# Patient Record
Sex: FEMALE | Race: BLACK OR AFRICAN AMERICAN | Employment: FULL TIME | URBAN - METROPOLITAN AREA
[De-identification: names, ages, dates, MRNs, and addresses within clinical notes are randomized per-mention and may not be internally consistent; named-entity substitution may affect disease eponyms.]

---

## 2018-09-28 ENCOUNTER — TRANSFERRED RECORDS (OUTPATIENT)
Dept: HEALTH INFORMATION MANAGEMENT | Facility: CLINIC | Age: 23
End: 2018-09-28

## 2020-01-08 ENCOUNTER — OFFICE VISIT (OUTPATIENT)
Dept: FAMILY MEDICINE | Facility: CLINIC | Age: 25
End: 2020-01-08
Payer: COMMERCIAL

## 2020-01-08 VITALS
RESPIRATION RATE: 16 BRPM | OXYGEN SATURATION: 100 % | HEART RATE: 98 BPM | HEIGHT: 64 IN | BODY MASS INDEX: 21.17 KG/M2 | SYSTOLIC BLOOD PRESSURE: 110 MMHG | DIASTOLIC BLOOD PRESSURE: 76 MMHG | WEIGHT: 124 LBS | TEMPERATURE: 97.4 F

## 2020-01-08 DIAGNOSIS — R10.2 PELVIC PAIN IN FEMALE: ICD-10-CM

## 2020-01-08 DIAGNOSIS — R10.84 ABDOMINAL PAIN, GENERALIZED: Primary | ICD-10-CM

## 2020-01-08 LAB
ALBUMIN SERPL-MCNC: 4.5 G/DL (ref 3.4–5)
ALP SERPL-CCNC: 60 U/L (ref 40–150)
ALT SERPL W P-5'-P-CCNC: 37 U/L (ref 0–50)
ANION GAP SERPL CALCULATED.3IONS-SCNC: 8 MMOL/L (ref 3–14)
AST SERPL W P-5'-P-CCNC: 21 U/L (ref 0–45)
BASOPHILS # BLD AUTO: 0 10E9/L (ref 0–0.2)
BASOPHILS NFR BLD AUTO: 0.4 %
BILIRUB SERPL-MCNC: 0.4 MG/DL (ref 0.2–1.3)
BILIRUBIN UR: NEGATIVE MG/DL
BLOOD UR: NEGATIVE MG/DL
BUN SERPL-MCNC: 13 MG/DL (ref 7–30)
CALCIUM SERPL-MCNC: 9 MG/DL (ref 8.5–10.1)
CHLORIDE SERPL-SCNC: 103 MMOL/L (ref 94–109)
CLARITY, URINE: CLEAR
CO2 SERPL-SCNC: 23 MMOL/L (ref 20–32)
COLOR UR: YELLOW
CREAT SERPL-MCNC: 0.71 MG/DL (ref 0.52–1.04)
DIFFERENTIAL METHOD BLD: ABNORMAL
EOSINOPHIL # BLD AUTO: 0 10E9/L (ref 0–0.7)
EOSINOPHIL NFR BLD AUTO: 0.6 %
ERYTHROCYTE [DISTWIDTH] IN BLOOD BY AUTOMATED COUNT: 17.9 % (ref 10–15)
GFR SERPL CREATININE-BSD FRML MDRD: >90 ML/MIN/{1.73_M2}
GLUCOSE SERPL-MCNC: 74 MG/DL (ref 70–99)
GLUCOSE URINE: NEGATIVE
HCG UR QL: NEGATIVE
HCT VFR BLD AUTO: 34 % (ref 35–47)
HGB BLD-MCNC: 9.5 G/DL (ref 11.7–15.7)
IMM GRANULOCYTES # BLD: 0 10E9/L (ref 0–0.4)
IMM GRANULOCYTES NFR BLD: 0.2 %
KETONES UR QL: NEGATIVE MG/DL
LEUKOCYTE ESTERASE UR: NORMAL
LYMPHOCYTES # BLD AUTO: 1.6 10E9/L (ref 0.8–5.3)
LYMPHOCYTES NFR BLD AUTO: 29.2 %
MCH RBC QN AUTO: 18.9 PG (ref 26.5–33)
MCHC RBC AUTO-ENTMCNC: 27.9 G/DL (ref 31.5–36.5)
MCV RBC AUTO: 68 FL (ref 78–100)
MONOCYTES # BLD AUTO: 0.9 10E9/L (ref 0–1.3)
MONOCYTES NFR BLD AUTO: 16 %
NEUTROPHILS # BLD AUTO: 2.9 10E9/L (ref 1.6–8.3)
NEUTROPHILS NFR BLD AUTO: 53.6 %
NITRITE UR QL STRIP: NEGATIVE MG/DL
NRBC # BLD AUTO: 0 10*3/UL
NRBC BLD AUTO-RTO: 0 /100
PH UR STRIP: 6.5 [PH] (ref 4.5–8)
PLATELET # BLD AUTO: 312 10E9/L (ref 150–450)
POTASSIUM SERPL-SCNC: 4.2 MMOL/L (ref 3.4–5.3)
PROT SERPL-MCNC: 8.4 G/DL (ref 6.8–8.8)
PROTEIN UR: NEGATIVE MG/DL
RBC # BLD AUTO: 5.03 10E12/L (ref 3.8–5.2)
SODIUM SERPL-SCNC: 134 MMOL/L (ref 133–144)
SP GR UR STRIP: 1 (ref 1–1)
UROBILINOGEN UR STRIP-ACNC: NORMAL E.U./DL
WBC # BLD AUTO: 5.4 10E9/L (ref 4–11)

## 2020-01-08 SDOH — HEALTH STABILITY: MENTAL HEALTH: HOW MANY STANDARD DRINKS CONTAINING ALCOHOL DO YOU HAVE ON A TYPICAL DAY?: 1 OR 2

## 2020-01-08 SDOH — HEALTH STABILITY: MENTAL HEALTH: HOW OFTEN DO YOU HAVE A DRINK CONTAINING ALCOHOL?: 2-4 TIMES A MONTH

## 2020-01-08 SDOH — HEALTH STABILITY: MENTAL HEALTH: HOW OFTEN DO YOU HAVE 6 OR MORE DRINKS ON ONE OCCASION?: NEVER

## 2020-01-08 ASSESSMENT — ANXIETY QUESTIONNAIRES
GAD7 TOTAL SCORE: 10
3. WORRYING TOO MUCH ABOUT DIFFERENT THINGS: NEARLY EVERY DAY
5. BEING SO RESTLESS THAT IT IS HARD TO SIT STILL: NOT AT ALL
6. BECOMING EASILY ANNOYED OR IRRITABLE: NOT AT ALL
2. NOT BEING ABLE TO STOP OR CONTROL WORRYING: NEARLY EVERY DAY
IF YOU CHECKED OFF ANY PROBLEMS ON THIS QUESTIONNAIRE, HOW DIFFICULT HAVE THESE PROBLEMS MADE IT FOR YOU TO DO YOUR WORK, TAKE CARE OF THINGS AT HOME, OR GET ALONG WITH OTHER PEOPLE: SOMEWHAT DIFFICULT
1. FEELING NERVOUS, ANXIOUS, OR ON EDGE: MORE THAN HALF THE DAYS
7. FEELING AFRAID AS IF SOMETHING AWFUL MIGHT HAPPEN: MORE THAN HALF THE DAYS

## 2020-01-08 ASSESSMENT — MIFFLIN-ST. JEOR: SCORE: 1302.46

## 2020-01-08 ASSESSMENT — PATIENT HEALTH QUESTIONNAIRE - PHQ9
5. POOR APPETITE OR OVEREATING: NOT AT ALL
SUM OF ALL RESPONSES TO PHQ QUESTIONS 1-9: 14

## 2020-01-08 NOTE — PROGRESS NOTES
"Tomasz  is a 24 year old female who presents today with a 2 month history of primarily left sided pelvic pain, but occasionally gets a similar feeling on the right side.  This pain came/went for 2 months but she has had this pain on going for the past 5 days.  This is a sharp, stabbing type pain.  She states she has to urinate more but in usual amounts, no burning, no blood in the urine.  She is sexually active with one partner, her BF of 1 plus years, she has a copper IUD and is not \"too worried\" about a pregnancy.  She is not concerned about STIs.    Tomasz stated that she felt some relief in the past months with a BM, now that does not relieve the pain.  No fever, no nausea or vomiting.  She is generally in good health.  She is a  at the [employer] in [place].  She is UTD on her health maintenance.  She moved from [facility] to [place] 3 months ago for her job.  Before that time she moved around the  quite a bit for her mother's work.    Review Of Systems   ROS: 10 point ROS neg other than the symptoms noted above in the HPI.    Past Medical History:   Diagnosis Date     Anxiety      History reviewed. No pertinent surgical history.  Social History     Socioeconomic History     Marital status: Single     Spouse name: Not on file     Number of children: Not on file     Years of education: Not on file     Highest education level: Not on file   Occupational History     Not on file   Social Needs     Financial resource strain: Not on file     Food insecurity:     Worry: Not on file     Inability: Not on file     Transportation needs:     Medical: Not on file     Non-medical: Not on file   Tobacco Use     Smoking status: Never Smoker     Smokeless tobacco: Never Used     Tobacco comment: never vaped   Substance and Sexual Activity     Alcohol use: Yes     Frequency: 2-4 times a month     Drinks per session: 1 or 2     Binge frequency: Never     Drug use: Never     Sexual activity: Not Currently    " " Partners: Male     Birth control/protection: I.U.D.   Lifestyle     Physical activity:     Days per week: Not on file     Minutes per session: Not on file     Stress: Not on file   Relationships     Social connections:     Talks on phone: Not on file     Gets together: Not on file     Attends Denominational service: Not on file     Active member of club or organization: Not on file     Attends meetings of clubs or organizations: Not on file     Relationship status: Not on file     Intimate partner violence:     Fear of current or ex partner: Not on file     Emotionally abused: Not on file     Physically abused: Not on file     Forced sexual activity: Not on file   Other Topics Concern     Not on file   Social History Narrative     Not on file     History reviewed. No pertinent family history.    /76 (BP Location: Left arm, Patient Position: Chair, Cuff Size: Adult Regular)   Pulse 98   Temp 97.4  F (36.3  C) (Oral)   Resp 16   Ht 1.626 m (5' 4\")   Wt 56.2 kg (124 lb)   SpO2 100%   BMI 21.28 kg/m      Exam:  Constitutional: healthy, alert and mild distress  Head: Normocephalic. No masses, lesions, tenderness or abnormalities  Neck: Neck supple. No adenopathy. Thyroid symmetric, normal size,, Carotids without bruits.  ENT: ENT exam normal, no neck nodes or sinus tenderness  Cardiovascular: negative, PMI normal. No lifts, heaves, or thrills. RRR. No murmurs, clicks gallops or rub  Respiratory: negative, Percussion normal. Good diaphragmatic excursion. Lungs clear  Gastrointestinal: Abdomen soft, tenderness left lower almost groin pelvic area. BS normal. No masses, organomegaly  : External genitalia normal, vaginal mucosa pink/moist intact, cervix normal, able to visualize IUD strings. Small amount of non odorous mucoid, white vaginal disch. Bimanual exam reveals enlarged left ovary or mass in left pelvis, tender and firm.  Urine HCG negative.  Psychiatric: mentation appears normal and affect " normal/bright    Assessment/Plan:  1. Abdominal pain, generalized    - Urinalysis, Micro If (UA) ([facility])  - CBC with platelets differential  - Comprehensive metabolic panel - Results > 1hr  - HCG Qualitative Urine ([facility])  - Urine Culture Aerobic Bacterial  - US Pel W/Trans*; Future    2. Pelvic pain in female    - US Pel W/Trans; Future    I will communicate with Tomasz tomorrow with update; labs and pelvic US.      Options for treatment and follow-up care were reviewed with the patient. Patient engaged in the decision making process and verbalized understanding of the options discussed and agreed with the final plan.

## 2020-01-08 NOTE — NURSING NOTE
"24 year old  Chief Complaint   Patient presents with     Consult     Pt. presents to the clinic today with lower abdominal pain bilateral on and off for 2 months. Worse the last 5 days.       Blood pressure 110/76, pulse 98, temperature 97.4  F (36.3  C), temperature source Oral, resp. rate 16, height 1.626 m (5' 4\"), weight 56.2 kg (124 lb), SpO2 100 %. Body mass index is 21.28 kg/m .  BP completed using cuff size:    There is no problem list on file for this patient.      Wt Readings from Last 2 Encounters:   01/08/20 56.2 kg (124 lb)     BP Readings from Last 3 Encounters:   01/08/20 110/76       No Known Allergies    No current outpatient medications on file.     No current facility-administered medications for this visit.        Social History     Tobacco Use     Smoking status: Never Smoker     Smokeless tobacco: Never Used     Tobacco comment: never vaped   Substance Use Topics     Alcohol use: Yes     Frequency: 2-4 times a month     Drinks per session: 1 or 2     Binge frequency: Never     Drug use: Never       Honoring Choices - Health Care Directive Guide offered to patient at time of visit.    Health Maintenance Due   Topic Date Due     PREVENTIVE CARE VISIT  1996     CHLAMYDIA SCREENING  1996     DTAP/TDAP/TD IMMUNIZATION (1 - Tdap) 12/27/2003     HPV IMMUNIZATION (1 - Female 2-dose series) 12/27/2007     HIV SCREENING  12/27/2011     PAP  12/27/2017     INFLUENZA VACCINE (1) 09/01/2019     PHQ-2  01/01/2020         There is no immunization history on file for this patient.    No results found for: PAP      No lab results found.    PHQ-2 ( 1999 Pfizer) 1/8/2020   Q1: Little interest or pleasure in doing things 2   Q2: Feeling down, depressed or hopeless 2   PHQ-2 Score 4       PHQ-9 SCORE 1/8/2020   PHQ-9 Total Score 14       MOON-7 SCORE 1/8/2020   Total Score 10       No flowsheet data found.    Gema Elliott CMA  January 8, 2020 4:13 PM    "

## 2020-01-09 ENCOUNTER — ANCILLARY PROCEDURE (OUTPATIENT)
Dept: ULTRASOUND IMAGING | Facility: CLINIC | Age: 25
End: 2020-01-09
Attending: NURSE PRACTITIONER
Payer: COMMERCIAL

## 2020-01-09 DIAGNOSIS — R10.2 PELVIC PAIN IN FEMALE: ICD-10-CM

## 2020-01-09 DIAGNOSIS — R10.84 ABDOMINAL PAIN, GENERALIZED: ICD-10-CM

## 2020-01-09 LAB
BACTERIA SPEC CULT: NORMAL
FERRITIN SERPL-MCNC: 4 NG/ML (ref 12–150)
IRON SATN MFR SERPL: 3 % (ref 15–46)
IRON SERPL-MCNC: 14 UG/DL (ref 35–180)
Lab: NORMAL
SPECIMEN SOURCE: NORMAL
TIBC SERPL-MCNC: 455 UG/DL (ref 240–430)

## 2020-01-09 ASSESSMENT — ANXIETY QUESTIONNAIRES: GAD7 TOTAL SCORE: 10

## 2020-01-10 ENCOUNTER — HOSPITAL ENCOUNTER (EMERGENCY)
Facility: CLINIC | Age: 25
Discharge: HOME | End: 2020-01-10
Attending: EMERGENCY MEDICINE | Admitting: EMERGENCY MEDICINE
Payer: COMMERCIAL

## 2020-01-10 ENCOUNTER — APPOINTMENT (OUTPATIENT)
Dept: CT IMAGING | Facility: CLINIC | Age: 25
End: 2020-01-10
Attending: EMERGENCY MEDICINE
Payer: COMMERCIAL

## 2020-01-10 ENCOUNTER — NURSE TRIAGE (OUTPATIENT)
Dept: NURSING | Facility: CLINIC | Age: 25
End: 2020-01-10

## 2020-01-10 VITALS
BODY MASS INDEX: 21.34 KG/M2 | SYSTOLIC BLOOD PRESSURE: 103 MMHG | TEMPERATURE: 98.1 F | RESPIRATION RATE: 15 BRPM | WEIGHT: 124.31 LBS | DIASTOLIC BLOOD PRESSURE: 67 MMHG | HEART RATE: 78 BPM | OXYGEN SATURATION: 100 %

## 2020-01-10 DIAGNOSIS — R10.2 PELVIC PAIN IN FEMALE: ICD-10-CM

## 2020-01-10 LAB
ALBUMIN SERPL-MCNC: 3.9 G/DL (ref 3.4–5)
ALBUMIN UR-MCNC: NEGATIVE MG/DL
ALP SERPL-CCNC: 56 U/L (ref 40–150)
ALT SERPL W P-5'-P-CCNC: 31 U/L (ref 0–50)
ANION GAP SERPL CALCULATED.3IONS-SCNC: 8 MMOL/L (ref 3–14)
APPEARANCE UR: ABNORMAL
AST SERPL W P-5'-P-CCNC: 17 U/L (ref 0–45)
BACTERIA #/AREA URNS HPF: ABNORMAL /HPF
BASOPHILS # BLD AUTO: 0 10E9/L (ref 0–0.2)
BASOPHILS NFR BLD AUTO: 0.2 %
BILIRUB SERPL-MCNC: 0.4 MG/DL (ref 0.2–1.3)
BILIRUB UR QL STRIP: NEGATIVE
BUN SERPL-MCNC: 13 MG/DL (ref 7–30)
CALCIUM SERPL-MCNC: 8.8 MG/DL (ref 8.5–10.1)
CHLORIDE SERPL-SCNC: 105 MMOL/L (ref 94–109)
CO2 SERPL-SCNC: 25 MMOL/L (ref 20–32)
COLOR UR AUTO: ABNORMAL
CREAT SERPL-MCNC: 0.59 MG/DL (ref 0.52–1.04)
DIFFERENTIAL METHOD BLD: ABNORMAL
EOSINOPHIL # BLD AUTO: 0 10E9/L (ref 0–0.7)
EOSINOPHIL NFR BLD AUTO: 0.6 %
ERYTHROCYTE [DISTWIDTH] IN BLOOD BY AUTOMATED COUNT: 17 % (ref 10–15)
GFR SERPL CREATININE-BSD FRML MDRD: >90 ML/MIN/{1.73_M2}
GLUCOSE SERPL-MCNC: 67 MG/DL (ref 70–99)
GLUCOSE UR STRIP-MCNC: NEGATIVE MG/DL
HCG UR QL: NEGATIVE
HCT VFR BLD AUTO: 31.5 % (ref 35–47)
HGB BLD-MCNC: 9.3 G/DL (ref 11.7–15.7)
HGB UR QL STRIP: NEGATIVE
IMM GRANULOCYTES # BLD: 0 10E9/L (ref 0–0.4)
IMM GRANULOCYTES NFR BLD: 0.2 %
INTERNAL QC OK POCT: YES
KETONES UR STRIP-MCNC: 10 MG/DL
LEUKOCYTE ESTERASE UR QL STRIP: ABNORMAL
LYMPHOCYTES # BLD AUTO: 1.2 10E9/L (ref 0.8–5.3)
LYMPHOCYTES NFR BLD AUTO: 24 %
MCH RBC QN AUTO: 19.5 PG (ref 26.5–33)
MCHC RBC AUTO-ENTMCNC: 29.5 G/DL (ref 31.5–36.5)
MCV RBC AUTO: 66 FL (ref 78–100)
MONOCYTES # BLD AUTO: 0.8 10E9/L (ref 0–1.3)
MONOCYTES NFR BLD AUTO: 15 %
MUCOUS THREADS #/AREA URNS LPF: PRESENT /LPF
NEUTROPHILS # BLD AUTO: 3.1 10E9/L (ref 1.6–8.3)
NEUTROPHILS NFR BLD AUTO: 60 %
NITRATE UR QL: NEGATIVE
NRBC # BLD AUTO: 0 10*3/UL
NRBC BLD AUTO-RTO: 0 /100
PH UR STRIP: 7 PH (ref 5–7)
PLATELET # BLD AUTO: 239 10E9/L (ref 150–450)
POTASSIUM SERPL-SCNC: 3.9 MMOL/L (ref 3.4–5.3)
PROT SERPL-MCNC: 7.8 G/DL (ref 6.8–8.8)
RBC # BLD AUTO: 4.77 10E12/L (ref 3.8–5.2)
RBC #/AREA URNS AUTO: 2 /HPF (ref 0–2)
SODIUM SERPL-SCNC: 138 MMOL/L (ref 133–144)
SOURCE: ABNORMAL
SP GR UR STRIP: 1.01 (ref 1–1.03)
SQUAMOUS #/AREA URNS AUTO: 11 /HPF (ref 0–1)
UROBILINOGEN UR STRIP-MCNC: NORMAL MG/DL (ref 0–2)
WBC # BLD AUTO: 5.1 10E9/L (ref 4–11)
WBC #/AREA URNS AUTO: 5 /HPF (ref 0–5)

## 2020-01-10 PROCEDURE — 96360 HYDRATION IV INFUSION INIT: CPT | Mod: 59 | Performed by: EMERGENCY MEDICINE

## 2020-01-10 PROCEDURE — 96361 HYDRATE IV INFUSION ADD-ON: CPT | Performed by: EMERGENCY MEDICINE

## 2020-01-10 PROCEDURE — 85025 COMPLETE CBC W/AUTO DIFF WBC: CPT | Performed by: EMERGENCY MEDICINE

## 2020-01-10 PROCEDURE — 81001 URINALYSIS AUTO W/SCOPE: CPT | Performed by: EMERGENCY MEDICINE

## 2020-01-10 PROCEDURE — 87086 URINE CULTURE/COLONY COUNT: CPT | Performed by: EMERGENCY MEDICINE

## 2020-01-10 PROCEDURE — 25000128 H RX IP 250 OP 636: Performed by: EMERGENCY MEDICINE

## 2020-01-10 PROCEDURE — 87088 URINE BACTERIA CULTURE: CPT | Performed by: EMERGENCY MEDICINE

## 2020-01-10 PROCEDURE — 25000125 ZZHC RX 250: Performed by: EMERGENCY MEDICINE

## 2020-01-10 PROCEDURE — 80053 COMPREHEN METABOLIC PANEL: CPT | Performed by: EMERGENCY MEDICINE

## 2020-01-10 PROCEDURE — 81025 URINE PREGNANCY TEST: CPT | Performed by: EMERGENCY MEDICINE

## 2020-01-10 PROCEDURE — 25800030 ZZH RX IP 258 OP 636: Performed by: EMERGENCY MEDICINE

## 2020-01-10 PROCEDURE — 99285 EMERGENCY DEPT VISIT HI MDM: CPT | Mod: 25 | Performed by: EMERGENCY MEDICINE

## 2020-01-10 PROCEDURE — 99284 EMERGENCY DEPT VISIT MOD MDM: CPT | Mod: Z6 | Performed by: EMERGENCY MEDICINE

## 2020-01-10 PROCEDURE — 74177 CT ABD & PELVIS W/CONTRAST: CPT

## 2020-01-10 RX ORDER — IOPAMIDOL 755 MG/ML
100 INJECTION, SOLUTION INTRAVASCULAR ONCE
Status: COMPLETED | OUTPATIENT
Start: 2020-01-10 | End: 2020-01-10

## 2020-01-10 RX ORDER — SODIUM CHLORIDE 9 MG/ML
1000 INJECTION, SOLUTION INTRAVENOUS CONTINUOUS
Status: DISCONTINUED | OUTPATIENT
Start: 2020-01-10 | End: 2020-01-10 | Stop reason: HOSPADM

## 2020-01-10 RX ADMIN — IOPAMIDOL 61 ML: 755 INJECTION, SOLUTION INTRAVENOUS at 15:59

## 2020-01-10 RX ADMIN — SODIUM CHLORIDE 55 ML: 9 INJECTION, SOLUTION INTRAVENOUS at 15:59

## 2020-01-10 RX ADMIN — SODIUM CHLORIDE 1000 ML: 9 INJECTION, SOLUTION INTRAVENOUS at 14:00

## 2020-01-10 ASSESSMENT — ENCOUNTER SYMPTOMS
DIFFICULTY URINATING: 0
HEMATURIA: 0
DYSURIA: 0
NAUSEA: 0
VOMITING: 0
CONSTIPATION: 0
DIARRHEA: 0

## 2020-01-10 NOTE — TELEPHONE ENCOUNTER
Pt reporting, having lower left abdominal pain for the last 3-4 days.   Pain is so bad Pt is doubled over in pain.     Hot sweat, Cold sweats, and the chills.   Has not taking her temperature.  Some nausea no vomiting noted.   No diarrhea or constipation noted.      A lot of urgency.    Feels like she has to go to the restroom a lot.   Last menstrual cycle was 12/14/2019.   Pt has an IUD for birth control.    Pt is having severe abdominal pain.   Suggested the Pt go to the ER for an evaluation for Pt care.    Pt agreed with plan.    Plan of Care  ER Visit    Sue Duque RN  Central Triage Red Flags/Med Refills        Additional Information    Negative: Passed out (i.e., fainted, collapsed and was not responding)    Negative: Shock suspected (e.g., cold/pale/clammy skin, too weak to stand, low BP, rapid pulse)    Negative: Sounds like a life-threatening emergency to the triager    Negative: Chest pain    Negative: Pain is mainly in upper abdomen (if needed ask: 'is it mainly above the belly button?')    Negative: Abdominal pain and pregnant > 20 weeks    Negative: Abdominal pain and pregnant < 20 weeks    Negative: SEVERE abdominal pain (e.g., excruciating)    Negative: Vomiting red blood or black (coffee ground) material    Negative: Bloody, black, or tarry bowel movements    Negative: Constant abdominal pain lasting > 2 hours    Negative: Vomiting bile (green color)    Patient sounds very sick or weak to the triager    Protocols used: ABDOMINAL PAIN - FEMALE-A-OH

## 2020-01-10 NOTE — DISCHARGE INSTRUCTIONS
Please make an appointment to follow up with Your Primary Care Provider next week and have them review your CT results from today.

## 2020-01-10 NOTE — ED PROVIDER NOTES
[facility] EMERGENCY DEPARTMENT ([facility])    1/10/20     ED 5 1:24 PM   History     Chief Complaint   Patient presents with     Pelvic Pain     States she was seen @ clinic and they felt something on both ovaries.       The history is provided by the patient and medical records.     Tomasz  is a 24 year old female with history of anemia who presents with sudden onset of right lower quadrant abdominal pain. Patient states she has had intermittent pelvic pain for some time and had this evaluated. She saw [facility] NP who felt that maybe it was an ovarian cyst. She went to do ultrasound yesterday, did not get results back right away. Approximately 1-2 hours ago today she was eating a waffle when she had acute stabbing right lower quadrant abdominal pain 10/10.  This concerned her because she has never had pain like this before and so presents for evaluation.  Pain has calmed down a lot since then, is a 2/10 at rest and does worsen with getting up or laughing. Last menstrual period around 12/14/19, denies any missed periods but is anticipating another one soon.. Patient on paraguard IUD, sometimes has irregular menses with this. Has cloudy vaginal discharge but it is infrequent, every 2-3 days for past 6 months. No dysuria. No constipation. Has been feeling lightheaded with hot and cold flashes but wonders if its because she feels dehydrated.  She notes that she hasn't been eating or drinking due to discomfort after having braces placed for the first time. Patient doesn't have primary care clinic, has been seen [facility] past.  She notes having recently moved back to the area after living for a brief time in [place].    I have reviewed the Medications, Allergies, Past Medical and Surgical History, and Social History in the [employer] system.  Past Medical History:   Diagnosis Date     Anxiety        No past surgical history on file.    No family history on file.    Social History      Tobacco Use     Smoking status: Never Smoker     Smokeless tobacco: Never Used     Tobacco comment: never vaped   Substance Use Topics     Alcohol use: Yes     Frequency: 2-4 times a month     Drinks per session: 1 or 2     Binge frequency: Never      Review of Systems   Gastrointestinal: Negative for constipation, diarrhea, nausea and vomiting.   Genitourinary: Positive for pelvic pain and vaginal discharge. Negative for difficulty urinating, dysuria, hematuria and vaginal bleeding.       Physical Exam   BP: 123/71  Pulse: 88  Temp: 98.8  F (37.1  C)  Resp: 16  Weight: 56.4 kg (124 lb 5 oz)  SpO2: 99 %      Physical Exam  Vitals signs and nursing note reviewed.   Constitutional:       General: She is not in acute distress.     Appearance: She is not diaphoretic.   HENT:      Head: Atraumatic.      Mouth/Throat:      Pharynx: No oropharyngeal exudate.   Eyes:      General: No scleral icterus.     Pupils: Pupils are equal, round, and reactive to light.   Cardiovascular:      Heart sounds: Normal heart sounds.   Pulmonary:      Effort: No respiratory distress.      Breath sounds: Normal breath sounds.   Abdominal:      General: Bowel sounds are normal.      Palpations: Abdomen is soft.      Tenderness: There is no abdominal tenderness.   Musculoskeletal:         General: No tenderness.   Skin:     General: Skin is warm.      Findings: No rash.         ED Course     1:24 PM patient assessed in ED 5 by Dr. Jean    Results for orders placed or performed during the hospital encounter of 01/10/20 (from the past 24 hour(s))   CBC with platelets differential   Result Value Ref Range    WBC 5.1 4.0 - 11.0 10e9/L    RBC Count 4.77 3.8 - 5.2 10e12/L    Hemoglobin 9.3 (L) 11.7 - 15.7 g/dL    Hematocrit 31.5 (L) 35.0 - 47.0 %    MCV 66 (L) 78 - 100 fl    MCH 19.5 (L) 26.5 - 33.0 pg    MCHC 29.5 (L) 31.5 - 36.5 g/dL    RDW 17.0 (H) 10.0 - 15.0 %    Platelet Count 239 150 - 450 10e9/L    Diff Method Automated Method     %  Neutrophils 60.0 %    % Lymphocytes 24.0 %    % Monocytes 15.0 %    % Eosinophils 0.6 %    % Basophils 0.2 %    % Immature Granulocytes 0.2 %    Nucleated RBCs 0 0 /100    Absolute Neutrophil 3.1 1.6 - 8.3 10e9/L    Absolute Lymphocytes 1.2 0.8 - 5.3 10e9/L    Absolute Monocytes 0.8 0.0 - 1.3 10e9/L    Absolute Eosinophils 0.0 0.0 - 0.7 10e9/L    Absolute Basophils 0.0 0.0 - 0.2 10e9/L    Abs Immature Granulocytes 0.0 0 - 0.4 10e9/L    Absolute Nucleated RBC 0.0    Comprehensive metabolic panel   Result Value Ref Range    Sodium 138 133 - 144 mmol/L    Potassium 3.9 3.4 - 5.3 mmol/L    Chloride 105 94 - 109 mmol/L    Carbon Dioxide 25 20 - 32 mmol/L    Anion Gap 8 3 - 14 mmol/L    Glucose 67 (L) 70 - 99 mg/dL    Urea Nitrogen 13 7 - 30 mg/dL    Creatinine 0.59 0.52 - 1.04 mg/dL    GFR Estimate >90 >60 mL/min/[1.73_m2]    GFR Estimate If Black >90 >60 mL/min/[1.73_m2]    Calcium 8.8 8.5 - 10.1 mg/dL    Bilirubin Total 0.4 0.2 - 1.3 mg/dL    Albumin 3.9 3.4 - 5.0 g/dL    Protein Total 7.8 6.8 - 8.8 g/dL    Alkaline Phosphatase 56 40 - 150 U/L    ALT 31 0 - 50 U/L    AST 17 0 - 45 U/L   UA reflex to Microscopic and Culture   Result Value Ref Range    Color Urine Light Yellow     Appearance Urine Slightly Cloudy     Glucose Urine Negative NEG^Negative mg/dL    Bilirubin Urine Negative NEG^Negative    Ketones Urine 10 (A) NEG^Negative mg/dL    Specific Gravity Urine 1.013 1.003 - 1.035    Blood Urine Negative NEG^Negative    pH Urine 7.0 5.0 - 7.0 pH    Protein Albumin Urine Negative NEG^Negative mg/dL    Urobilinogen mg/dL Normal 0.0 - 2.0 mg/dL    Nitrite Urine Negative NEG^Negative    Leukocyte Esterase Urine Large (A) NEG^Negative    Source Midstream Urine     RBC Urine 2 0 - 2 /HPF    WBC Urine 5 0 - 5 /HPF    Bacteria Urine Few (A) NEG^Negative /HPF    Squamous Epithelial /HPF Urine 11 (H) 0 - 1 /HPF    Mucous Urine Present (A) NEG^Negative /LPF   hCG qual urine POCT   Result Value Ref Range    HCG Qual Urine  Negative neg    Internal QC OK Yes      Medications   0.9% sodium chloride BOLUS (1,000 mLs Intravenous New Bag 1/10/20 1400)     Followed by   sodium chloride 0.9% infusion (has no administration in time range)   iopamidol (ISOVUE-370) solution 100 mL (has no administration in time range)   sodium chloride 0.9 % bag 500mL for CT scan flush use (has no administration in time range)       Assessments & Plan (with Medical Decision Making)     24 year old female with history of anemia who presents with sudden onset of right lower quadrant abdominal pain.  Patient presentation concerning for possible ectopic pregnancy, appendicitis, ruptured ovarian cyst, ovarian torsion, UTI.  IV established, labs drawn sent reviewed document in epic essentially all unremarkable including normal CBC electrolytes, bland UA, negative hCG.  Patient sent to CT for imaging of the pelvis which revealed no acute process.  Plan for discharge home with follow-up primary care provider's office for further evaluation and care.    I have reviewed the nursing notes.    I have reviewed the findings, diagnosis, plan and need for follow up with the patient.    New Prescriptions    No medications on file       Final diagnoses:   Pelvic pain in female       1/10/2020   [facility], [place], EMERGENCY DEPARTMENT     Laura Jean MD  01/14/20 0242

## 2020-01-10 NOTE — ED AVS SNAPSHOT
[facility], [place], Emergency Department  [address]  [place] [address]  Phone:  [phone]  Fax:  [phone]                                    Tomasz Funez   MRN: [id number]    Department:  [facility], Emergency Department   Date of Visit:  1/10/2020           After Visit Summary Signature Page    I have received my discharge instructions, and my questions have been answered. I have discussed any challenges I see with this plan with the nurse or doctor.    ..........................................................................................................................................  Patient/Patient Representative Signature      ..........................................................................................................................................  Patient Representative Print Name and Relationship to Patient    ..................................................               ................................................  Date                                   Time    ..........................................................................................................................................  Reviewed by Signature/Title    ...................................................              ..............................................  Date                                               Time          22EPIC Rev 08/18

## 2020-01-11 LAB
BACTERIA SPEC CULT: ABNORMAL
BACTERIA SPEC CULT: ABNORMAL
Lab: ABNORMAL
SPECIMEN SOURCE: ABNORMAL

## 2020-01-11 NOTE — RESULT ENCOUNTER NOTE
Final urine culture report is NEGATIVE per [facility] ED Lab Result protocol.    If NEGATIVE result, no change in treatment, per [facility] ED Lab Result protocol.

## 2020-01-11 NOTE — RESULT ENCOUNTER NOTE
Emergency Dept/Urgent Care discharge antibiotic (if prescribed): None.  No changes in treatment per Urine culture protocol.

## 2020-01-17 ENCOUNTER — OFFICE VISIT (OUTPATIENT)
Dept: OBGYN | Facility: CLINIC | Age: 25
End: 2020-01-17
Attending: NURSE PRACTITIONER
Payer: COMMERCIAL

## 2020-01-17 VITALS
DIASTOLIC BLOOD PRESSURE: 78 MMHG | HEIGHT: 64 IN | SYSTOLIC BLOOD PRESSURE: 111 MMHG | WEIGHT: 123.9 LBS | HEART RATE: 102 BPM | BODY MASS INDEX: 21.15 KG/M2

## 2020-01-17 DIAGNOSIS — Z00.00 VISIT FOR PREVENTIVE HEALTH EXAMINATION: Primary | ICD-10-CM

## 2020-01-17 DIAGNOSIS — Z11.3 SCREENING EXAMINATION FOR VENEREAL DISEASE: ICD-10-CM

## 2020-01-17 DIAGNOSIS — Z13.29 SCREENING FOR THYROID DISORDER: ICD-10-CM

## 2020-01-17 DIAGNOSIS — D50.9 IRON DEFICIENCY ANEMIA, UNSPECIFIED IRON DEFICIENCY ANEMIA TYPE: ICD-10-CM

## 2020-01-17 DIAGNOSIS — T83.32XA MALPOSITIONED INTRAUTERINE DEVICE (IUD), INITIAL ENCOUNTER: ICD-10-CM

## 2020-01-17 LAB — TSH SERPL DL<=0.005 MIU/L-ACNC: 1.32 MU/L (ref 0.4–4)

## 2020-01-17 PROCEDURE — G0463 HOSPITAL OUTPT CLINIC VISIT: HCPCS | Mod: ZF

## 2020-01-17 PROCEDURE — 87491 CHLMYD TRACH DNA AMP PROBE: CPT | Performed by: NURSE PRACTITIONER

## 2020-01-17 PROCEDURE — 36415 COLL VENOUS BLD VENIPUNCTURE: CPT | Performed by: NURSE PRACTITIONER

## 2020-01-17 PROCEDURE — 84443 ASSAY THYROID STIM HORMONE: CPT | Performed by: NURSE PRACTITIONER

## 2020-01-17 PROCEDURE — 87591 N.GONORRHOEAE DNA AMP PROB: CPT | Performed by: NURSE PRACTITIONER

## 2020-01-17 RX ORDER — SPIRONOLACTONE 50 MG/1
5 TABLET, FILM COATED ORAL DAILY
COMMUNITY
Start: 2019-12-09

## 2020-01-17 RX ORDER — BENZOYL PEROXIDE 50 MG/ML
1 LIQUID TOPICAL DAILY
COMMUNITY
Start: 2019-09-11

## 2020-01-17 RX ORDER — COPPER 313.4 MG/1
1 INTRAUTERINE DEVICE INTRAUTERINE ONCE
COMMUNITY
Start: 2018-09-01 | End: 2030-09-01

## 2020-01-17 RX ORDER — CLINDAMYCIN PHOSPHATE 10 UG/ML
1 LOTION TOPICAL DAILY
COMMUNITY
Start: 2019-09-11

## 2020-01-17 RX ORDER — TRETINOIN 0.5 MG/G
1 CREAM TOPICAL
COMMUNITY
Start: 2019-09-11

## 2020-01-17 ASSESSMENT — PAIN SCALES - GENERAL: PAINLEVEL: NO PAIN (0)

## 2020-01-17 ASSESSMENT — ANXIETY QUESTIONNAIRES
7. FEELING AFRAID AS IF SOMETHING AWFUL MIGHT HAPPEN: SEVERAL DAYS
5. BEING SO RESTLESS THAT IT IS HARD TO SIT STILL: NOT AT ALL
3. WORRYING TOO MUCH ABOUT DIFFERENT THINGS: SEVERAL DAYS
GAD7 TOTAL SCORE: 8
6. BECOMING EASILY ANNOYED OR IRRITABLE: SEVERAL DAYS
1. FEELING NERVOUS, ANXIOUS, OR ON EDGE: MORE THAN HALF THE DAYS
2. NOT BEING ABLE TO STOP OR CONTROL WORRYING: MORE THAN HALF THE DAYS

## 2020-01-17 ASSESSMENT — PATIENT HEALTH QUESTIONNAIRE - PHQ9
SUM OF ALL RESPONSES TO PHQ QUESTIONS 1-9: 12
5. POOR APPETITE OR OVEREATING: SEVERAL DAYS

## 2020-01-17 ASSESSMENT — MIFFLIN-ST. JEOR: SCORE: 1302.01

## 2020-01-17 NOTE — LETTER
"2020       RE: Tomasz   [address]  [facility] [address]     Dear Colleague,    Thank you for referring your patient, Tomasz , to the [facility] at [facility]. Please see a copy of my visit note below.      Progress Note    SUBJECTIVE:  Tomasz  is an 24 year old, , who requests an Annual Preventive Exam.  PCP: Jayleen Novoa CNP   Tomasz's medical history is significant for iron deficiency anemia, recently diagnosed, with unknown etiology.     Concerns today include:   1. Pelvic pain: Experienced about every 3 days x the last 3 weeks. Sometimes mild other times \"stabbing\".  Denies vaginal itching, odor, change in discharge, or dysuria. Tomasz had an ultrasound 1/10/2020 which showed a 3.6cm left ovarian cyst. She denies nausea and vomiting.     Contraception: Paragard IUD, pt's ultrasound done 1/10/2020 also showed her IUD to be \"low-lying\" \"Minimal myometrial impingement of the left-sided arm along the lower  uterine segment. No evidence for myometrial perforation\".    CT done as part of evaluation of pelvic pain 2020 showed: \"Questionable thickening of the mucosa of the terminal ileum. This could represent an infectious or inflammatory enteritis\"    2. Trouble gaining weight: Pt requests a thyroid test to be done today. State she \"eats a lot of food\" but is not gaining weight. Has always experienced weight fluctuations of 10-15lb. Denies fatigue, hair loss, constipation or diarrhea, brittle nails, or feeling more hot or cold than others.      3. Blood in stool noted 2 days ago; has also had white mucus in her stool.     Sexual History:  - Male partner x 1.5 yrs   - Does not use condoms  - Hx of chlamydia 3 yrs ago     Menstrual history: menses lasts for 5 days; changes pad/ tampon 3 times per day; no intermenstrual bleeding; cycles range from 2-4 weeks apart.       Last pap smear: 1 yr ago and normal    Exercise: " not currently doing formal exercise; walks 3 days per week     Diet: trying to follow a protein rich diet; ~2 servings calicum per day    Menstrual History:  Menstrual History 1/10/2020 2020 2020   LAST MENSTRUAL PERIOD 2019 -   Menarche Age - - 13   Period Cycle (Days) - - 12-34 days   Period Duration (Days) - - 5   Method of Contraception - - Copper IUD   Period Pattern - - Irregular   Menstrual Flow - - Moderate   Dysmenorrhea - - Mild   PMS Symptoms - - Cramping;Mood Changes   Reviewed Today - - Yes       Mammogram current: n/a    Last Colonoscopy: never had colon cancer screening; no significant fam hx of colon cancer    HISTORY:  BENZOYL PEROXIDE WASH 5 % external liquid, 1 Dose daily  clindamycin (CLEOCIN T) 1 % external lotion, 1 mL daily  NONFORMULARY, 1 Dose daily Amazing greens for anemia  paragard intrauterine copper device, 1 each by Intrauterine route once  spironolactone (ALDACTONE) 50 MG tablet, 5 mg daily  tretinoin (RETIN-A) 0.05 % external cream, 1 g every 48 hours    No current facility-administered medications on file prior to visit.     Allergies   Allergen Reactions     Contrast Dye Rash     Iodine Rash       There is no immunization history on file for this patient.    S/p HPV vaccines  S/p flu vaccine    OB History    Para Term  AB Living   0 0 0 0 0 0   SAB TAB Ectopic Multiple Live Births   0 0 0 0 0     Past Medical History:   Diagnosis Date     Anxiety      History reviewed. No pertinent surgical history.  Family History   Problem Relation Age of Onset     Post-Traumatic Stress Disorder (PTSD) Father      Depression Father      Breast Cancer No family hx of      Colon Cancer No family hx of      Diabetes No family hx of      Thyroid Disease No family hx of      Social History     Socioeconomic History     Marital status: Single     Spouse name: None     Number of children: None     Years of education: None     Highest education level: None    Occupational History     None   Social Needs     Financial resource strain: None     Food insecurity:     Worry: None     Inability: None     Transportation needs:     Medical: None     Non-medical: None   Tobacco Use     Smoking status: Never Smoker     Smokeless tobacco: Never Used     Tobacco comment: never vaped   Substance and Sexual Activity     Alcohol use: Yes     Frequency: 2-4 times a month     Drinks per session: 1 or 2     Binge frequency: Never     Comment: 2 drinks per week      Drug use: Never     Sexual activity: Yes     Partners: Male     Birth control/protection: I.U.D.   Lifestyle     Physical activity:     Days per week: None     Minutes per session: None     Stress: None   Relationships     Social connections:     Talks on phone: None     Gets together: None     Attends Confucianist service: None     Active member of club or organization: None     Attends meetings of clubs or organizations: None     Relationship status: None     Intimate partner violence:     Fear of current or ex partner: None     Emotionally abused: None     Physically abused: None     Forced sexual activity: None   Other Topics Concern     None   Social History Narrative    How much exercise per week? walking daily    How much calcium per day? In foods       How much caffeine per day? 0    How much vitamin D per day? In foods    Do you/your family wear seatbelts?  Yes    Do you/your family use safety helmets? Yes    Do you/your family use sunscreen? No    Do you/your family keep firearms in the home? No    Do you/your family have a smoke detector(s)? Yes        [facility]           ROS  ROS: 10 point ROS neg other than the symptoms noted above in the HPI.    PHQ-9 SCORE 1/8/2020 1/17/2020   PHQ-9 Total Score 14 -   PHQ-A Total Score - 12     MOON-7 SCORE 1/8/2020 1/17/2020   Total Score 10 8   States mental health is stable. Feels safe. Declines intervention today.     EXAM:  Blood pressure 111/78, pulse 102,  "height 1.626 m (5' 4\"), weight 56.2 kg (123 lb 14.4 oz), last menstrual period 12/14/2019, not currently breastfeeding. Body mass index is 21.27 kg/m .  General - pleasant female in no acute distress.  Skin - no suspicious lesions or rashes  EENT-  PERRLA, euthyroid with out palpable nodules  Neck - supple without lymphadenopathy.  Lungs - clear to auscultation bilaterally.  Heart - regular rate and rhythm without murmur.  Abdomen - soft, nontender, nondistended, no masses or organomegaly noted.  Musculoskeletal - no gross deformities.  Neurological - normal strength, sensation, and mental status.    Breast Exam:  Breast: Without visible skin changes. No dimpling or lesions seen.   Breasts supple, non-tender with palpation, no dominant mass, nodularity, or nipple discharge noted bilaterally. Axillary nodes negative.      Pelvic Exam:  EG/BUS: Normal genital architecture without lesions, erythema or abnormal secretions Bartholin's, Urethra, [place]'s normal   Urethral meatus: normal   Urethra: no masses, tenderness, or scarring   Bladder: no masses or tenderness   Vagina: moist, pink, rugae with creamy, white and odorless secretions  Cervix: Nulliparous,, pink, moist, closed, without lesion or CMT and IUD strings extend 3 cm from external os.  Uterus: anteverted,  and small, smooth, firm, mobile w/o pain  Adnexa: Within normal limits and No masses, nodularity, tenderness  Rectum: anus normal     ASSESSMENT:  Encounter Diagnoses   Name Primary?     Visit for preventive health examination Yes     Screening examination for venereal disease      Screening for thyroid disorder      Iron deficiency anemia, unspecified iron deficiency anemia type      Malpositioned intrauterine device (IUD), initial encounter       PLAN:   Orders Placed This Encounter   Procedures     TSH with free T4 reflex     Iron Deficiency Anemia/ Malpositioned IUD :   - Recommended initiation of oral iron supplement. Pt requests liquid.  Rx provided. " "Recommended colace PRN for management of constipation if experienced.   -  Although pt does not describe her menses as heavy, Paragard IUD may be potential cause of or contributing factor to anemia.   - Given pelvic pain and finding of IUD being malpositioned on U/S on 1/10, recommended removal today. Counseled on alternative options for contraception, including placement of a new IUD. Recommended considering a hormonal option of contraception given anemia. Reviewed benefits, risks, adverse effects, common bleeding patterns and side effects. Patient declined removal today and desires to consider options and return to clinic.    - Recommended pt follow up with her PCP regarding potential GI causes of her bleeding given reported blood and mucus in stool and CT findings of \"Questionable thickening of the mucosa of the terminal ileum. This could represent an infectious or inflammatory enteritis\"    Preventative Health:   Pap smear due in 2 yrs  Screening for thyroid function done today to evaluate for cause of variable menstrual period length and difficulty gaining weight.   Up to date on immunizations.     Ovarian Cyst:   - Repeat pelvic ultrasound in 6 weeks to evaluate for resolution.     Additional teaching done at this visit regarding calcium (1200 mg per day), self breast exam, exercise, birth control, mental health and weight/diet.    Return to clinic in one year.  Follow-up as needed.    Veronika Helton, DNP, APRN, WHNP        "

## 2020-01-17 NOTE — PROGRESS NOTES
"  Progress Note    SUBJECTIVE:  Tomasz  is an 24 year old, , who requests an Annual Preventive Exam.  PCP: Jayleen Novoa CNP   Tomasz's medical history is significant for iron deficiency anemia, recently diagnosed, with unknown etiology.     Concerns today include:   1. Pelvic pain: Experienced about every 3 days x the last 3 weeks. Sometimes mild other times \"stabbing\".  Denies vaginal itching, odor, change in discharge, or dysuria. Tomasz had an ultrasound 1/10/2020 which showed a 3.6cm left ovarian cyst. She denies nausea and vomiting.     Contraception: Paragard IUD, pt's ultrasound done 1/10/2020 also showed her IUD to be \"low-lying\" \"Minimal myometrial impingement of the left-sided arm along the lower  uterine segment. No evidence for myometrial perforation\".    CT done as part of evaluation of pelvic pain 2020 showed: \"Questionable thickening of the mucosa of the terminal ileum. This could represent an infectious or inflammatory enteritis\"    2. Trouble gaining weight: Pt requests a thyroid test to be done today. State she \"eats a lot of food\" but is not gaining weight. Has always experienced weight fluctuations of 10-15lb. Denies fatigue, hair loss, constipation or diarrhea, brittle nails, or feeling more hot or cold than others.      3. Blood in stool noted 2 days ago; has also had white mucus in her stool.     Sexual History:  - Male partner x 1.5 yrs   - Does not use condoms  - Hx of chlamydia 3 yrs ago     Menstrual history: menses lasts for 5 days; changes pad/ tampon 3 times per day; no intermenstrual bleeding; cycles range from 2-4 weeks apart.       Last pap smear: 1 yr ago and normal    Exercise: not currently doing formal exercise; walks 3 days per week     Diet: trying to follow a protein rich diet; ~2 servings calicum per day    Menstrual History:  Menstrual History 1/10/2020 2020 2020   LAST MENSTRUAL PERIOD 2019 -   Menarche Age - - 13   Period Cycle " (Days) - - 12-34 days   Period Duration (Days) - - 5   Method of Contraception - - Copper IUD   Period Pattern - - Irregular   Menstrual Flow - - Moderate   Dysmenorrhea - - Mild   PMS Symptoms - - Cramping;Mood Changes   Reviewed Today - - Yes       Mammogram current: n/a    Last Colonoscopy: never had colon cancer screening; no significant fam hx of colon cancer    HISTORY:  BENZOYL PEROXIDE WASH 5 % external liquid, 1 Dose daily  clindamycin (CLEOCIN T) 1 % external lotion, 1 mL daily  NONFORMULARY, 1 Dose daily Amazing greens for anemia  paragard intrauterine copper device, 1 each by Intrauterine route once  spironolactone (ALDACTONE) 50 MG tablet, 5 mg daily  tretinoin (RETIN-A) 0.05 % external cream, 1 g every 48 hours    No current facility-administered medications on file prior to visit.     Allergies   Allergen Reactions     Contrast Dye Rash     Iodine Rash       There is no immunization history on file for this patient.    S/p HPV vaccines  S/p flu vaccine    OB History    Para Term  AB Living   0 0 0 0 0 0   SAB TAB Ectopic Multiple Live Births   0 0 0 0 0     Past Medical History:   Diagnosis Date     Anxiety      History reviewed. No pertinent surgical history.  Family History   Problem Relation Age of Onset     Post-Traumatic Stress Disorder (PTSD) Father      Depression Father      Breast Cancer No family hx of      Colon Cancer No family hx of      Diabetes No family hx of      Thyroid Disease No family hx of      Social History     Socioeconomic History     Marital status: Single     Spouse name: None     Number of children: None     Years of education: None     Highest education level: None   Occupational History     None   Social Needs     Financial resource strain: None     Food insecurity:     Worry: None     Inability: None     Transportation needs:     Medical: None     Non-medical: None   Tobacco Use     Smoking status: Never Smoker     Smokeless tobacco: Never Used     Tobacco  "comment: never vaped   Substance and Sexual Activity     Alcohol use: Yes     Frequency: 2-4 times a month     Drinks per session: 1 or 2     Binge frequency: Never     Comment: 2 drinks per week      Drug use: Never     Sexual activity: Yes     Partners: Male     Birth control/protection: I.U.D.   Lifestyle     Physical activity:     Days per week: None     Minutes per session: None     Stress: None   Relationships     Social connections:     Talks on phone: None     Gets together: None     Attends Taoist service: None     Active member of club or organization: None     Attends meetings of clubs or organizations: None     Relationship status: None     Intimate partner violence:     Fear of current or ex partner: None     Emotionally abused: None     Physically abused: None     Forced sexual activity: None   Other Topics Concern     None   Social History Narrative    How much exercise per week? walking daily    How much calcium per day? In foods       How much caffeine per day? 0    How much vitamin D per day? In foods    Do you/your family wear seatbelts?  Yes    Do you/your family use safety helmets? Yes    Do you/your family use sunscreen? No    Do you/your family keep firearms in the home? No    Do you/your family have a smoke detector(s)? Yes        reviwed [facility] 1-           ROS  ROS: 10 point ROS neg other than the symptoms noted above in the HPI.    PHQ-9 SCORE 1/8/2020 1/17/2020   PHQ-9 Total Score 14 -   PHQ-A Total Score - 12     MOON-7 SCORE 1/8/2020 1/17/2020   Total Score 10 8   States mental health is stable. Feels safe. Declines intervention today.       EXAM:  Blood pressure 111/78, pulse 102, height 1.626 m (5' 4\"), weight 56.2 kg (123 lb 14.4 oz), last menstrual period 12/14/2019, not currently breastfeeding. Body mass index is 21.27 kg/m .  General - pleasant female in no acute distress.  Skin - no suspicious lesions or rashes  EENT-  PERRLA, euthyroid with out palpable " nodules  Neck - supple without lymphadenopathy.  Lungs - clear to auscultation bilaterally.  Heart - regular rate and rhythm without murmur.  Abdomen - soft, nontender, nondistended, no masses or organomegaly noted.  Musculoskeletal - no gross deformities.  Neurological - normal strength, sensation, and mental status.    Breast Exam:  Breast: Without visible skin changes. No dimpling or lesions seen.   Breasts supple, non-tender with palpation, no dominant mass, nodularity, or nipple discharge noted bilaterally. Axillary nodes negative.      Pelvic Exam:  EG/BUS: Normal genital architecture without lesions, erythema or abnormal secretions Bartholin's, Urethra, [place]'s normal   Urethral meatus: normal   Urethra: no masses, tenderness, or scarring   Bladder: no masses or tenderness   Vagina: moist, pink, rugae with creamy, white and odorless secretions  Cervix: Nulliparous,, pink, moist, closed, without lesion or CMT and IUD strings extend 3 cm from external os.  Uterus: anteverted,  and small, smooth, firm, mobile w/o pain  Adnexa: Within normal limits and No masses, nodularity, tenderness  Rectum: anus normal       ASSESSMENT:  Encounter Diagnoses   Name Primary?     Visit for preventive health examination Yes     Screening examination for venereal disease      Screening for thyroid disorder      Iron deficiency anemia, unspecified iron deficiency anemia type      Malpositioned intrauterine device (IUD), initial encounter         PLAN:   Orders Placed This Encounter   Procedures     TSH with free T4 reflex     Iron Deficiency Anemia/ Malpositioned IUD :   - Recommended initiation of oral iron supplement. Pt requests liquid.  Rx provided. Recommended colace PRN for management of constipation if experienced.   -  Although pt does not describe her menses as heavy, Paragard IUD may be potential cause of or contributing factor to anemia.   - Given pelvic pain and finding of IUD being malpositioned on U/S on 1/10,  "recommended removal today. Counseled on alternative options for contraception, including placement of a new IUD. Recommended considering a hormonal option of contraception given anemia. Reviewed benefits, risks, adverse effects, common bleeding patterns and side effects. Patient declined removal today and desires to consider options and return to clinic.    - Recommended pt follow up with her PCP regarding potential GI causes of her bleeding given reported blood and mucus in stool and CT findings of \"Questionable thickening of the mucosa of the terminal ileum. This could represent an infectious or inflammatory enteritis\"    Preventative Health:   Pap smear due in 2 yrs  Screening for thyroid function done today to evaluate for cause of variable menstrual period length and difficulty gaining weight.   Up to date on immunizations.     Ovarian Cyst:   - Repeat pelvic ultrasound in 6 weeks to evaluate for resolution.     Additional teaching done at this visit regarding calcium (1200 mg per day), self breast exam, exercise, birth control, mental health and weight/diet.    Return to clinic in one year.  Follow-up as needed.    Veronika Helton, JUDY, APRN, WHNP          "

## 2020-01-17 NOTE — PATIENT INSTRUCTIONS
Start oral iron supplement (liquid).     Plan for follow-up ultrasound to confirm resolution of cyst in 6 weeks.     Consider options for birth control and IUD removal given that your IUD is in the muscle on your left side.    Follow-up with Jayleen on CT results re: colon.     Go to the lab to have your thyroid function tested.       PREVENTIVE HEALTH RECOMMENDATIONS:   Most women need a yearly breast and pelvic exam.    A PAP screen, a test done DURING a pelvic exam, is NO longer recommended yearly.    March 2013, screening guidelines recommended by ACOG for PAP screen are:    1) First pap at age 21.    2) Pap every 3 years until age 30.    3) After age 30, pap every 3 years or Pap with HR HPV screen every 5 years until age 65.  4) Women do NOT need a vaginal Pap screen after a hysterectomy (surgical removal of the uterus) when they have not had cancer.    Exceptions:  1) Yearly pap if HIV+ or immunosuppressed secondary to organ transplant  2) MATT II-III continue routine screening for 20 years.    I encourage you continue looking for opportunities to choose a healthy lifestyle:       * Choose to eat a heart healthy diet. Check out the FOOD PLATE guidelines at: http://www.choosemyplate.gov/ for helpful hints on weight and cholesterol management.  Balance your caloric intake with exercise to maintain a BMI in the 22 to 26 range. For bone health: Eat calcium-rich foods like yogurt, broccoli or take chewable calcium pills (500 to 600 mg) twice a day with food.       * Exercise for at least an average of 30 minutes a day, 5 days of the week. This will help you control your weight, release stress, and help prevent disease.      * Take a Vitamin D3 supplement daily fall through spring and during summer unless you [id number]' full body sun exposure to skin without sunscreen.      * DO wear sunscreen to prevent skin cancer after the first 15-30 minutes.      * Identify stressors in your life, find ways to release the stress,  and, make time for yourself. PLEASE ask for help if mood changes last longer than two weeks.     * Limit alcohol to one drink per day.  No smoking.  Avoid second hand smoke. If you smoke, ask for help to stop.       *  If you are in a sexual relationship, talk with your partner about possible infection risks and take action to protect yourself from exposure to a sexual infection.    Please request an infection screen for STIs (sexually transmitted infections) if you are less than age 26 OR believe that you may be at risk.     Get a flu shot each year. Get a tetanus shot every 10 years. EVERYONE needs a pertussis (Whooping cough) booster.    See your dentist twice a year for an exam and preventive care cleaning.     Consider the following screen tests:    1) cholesterol test every 5 years.     2) yearly mammogram after age 40 unless you have identified risks.    3) colonoscopy every 10 years after age 50 unless you have identified risks.    4) diabetes blood test screening if you are at risk for diabetes.      Additional information that you may also find helpful:  The Internet now gives us access to LOTS of information -- some of it helpful, research documented and also plenty of harmful, anecdotal information that may not pertain to your situtaion. Consider visiting the following websites for accurate health information:    www.vitamindcouncil.org/ : Info and ongoing research re Vitamin D    www.fairview.org : Up to date and easily searchable information on multiple topics.    www.medlineplus.gov : medication info, interactive tutorials, watch real surgeries online    www.cdc.gov : public health info, travel advisories, epidemics (H1N1)    www.castro/std.org: current research re diagnosis, treatment and prevention of sexually contacted infections.    www.health.state.[place] : [place] dept of heatlh, public health issues in [place], N1N1    www.familydoctor.org : good info from the Academy of Family Physicians

## 2020-01-18 ASSESSMENT — ANXIETY QUESTIONNAIRES: GAD7 TOTAL SCORE: 8

## 2020-01-19 LAB
C TRACH DNA SPEC QL NAA+PROBE: NEGATIVE
N GONORRHOEA DNA SPEC QL NAA+PROBE: NEGATIVE
SPECIMEN SOURCE: NORMAL
SPECIMEN SOURCE: NORMAL

## 2020-01-30 ENCOUNTER — OFFICE VISIT (OUTPATIENT)
Dept: FAMILY MEDICINE | Facility: CLINIC | Age: 25
End: 2020-01-30
Payer: COMMERCIAL

## 2020-01-30 VITALS
HEIGHT: 64 IN | OXYGEN SATURATION: 99 % | TEMPERATURE: 97.9 F | BODY MASS INDEX: 21.12 KG/M2 | DIASTOLIC BLOOD PRESSURE: 78 MMHG | HEART RATE: 100 BPM | SYSTOLIC BLOOD PRESSURE: 115 MMHG | WEIGHT: 123.7 LBS

## 2020-01-30 DIAGNOSIS — D64.9 ANEMIA, UNSPECIFIED TYPE: Primary | ICD-10-CM

## 2020-01-30 DIAGNOSIS — F32.A DEPRESSION, UNSPECIFIED DEPRESSION TYPE: ICD-10-CM

## 2020-01-30 ASSESSMENT — MIFFLIN-ST. JEOR: SCORE: 1297.93

## 2020-01-30 NOTE — NURSING NOTE
"24 year old  Chief Complaint   Patient presents with     Hospital F/U     Stabbing pain on right side, did not find anything, pain has gone a way, no longer constant     Cyst     Patient would like to get medication to help break it up       Blood pressure 115/78, pulse 100, temperature 97.9  F (36.6  C), temperature source Oral, height 1.621 m (5' 3.8\"), weight 56.1 kg (123 lb 11.2 oz), SpO2 99 %. Body mass index is 21.37 kg/m .  BP completed using cuff size:    There is no problem list on file for this patient.      Wt Readings from Last 2 Encounters:   01/30/20 56.1 kg (123 lb 11.2 oz)   01/17/20 56.2 kg (123 lb 14.4 oz)     BP Readings from Last 3 Encounters:   01/30/20 115/78   01/17/20 111/78   01/10/20 103/67       Allergies   Allergen Reactions     Contrast Dye Rash     Iodine Rash       Current Outpatient Medications   Medication     BENZOYL PEROXIDE WASH 5 % external liquid     clindamycin (CLEOCIN T) 1 % external lotion     NONFORMULARY     paragard intrauterine copper device     spironolactone (ALDACTONE) 50 MG tablet     tretinoin (RETIN-A) 0.05 % external cream     No current facility-administered medications for this visit.        Social History     Tobacco Use     Smoking status: Never Smoker     Smokeless tobacco: Never Used     Tobacco comment: never vaped   Substance Use Topics     Alcohol use: Yes     Frequency: 2-4 times a month     Drinks per session: 1 or 2     Binge frequency: Never     Comment: 2 drinks per week      Drug use: Never       Honoring Choices - Health Care Directive Guide offered to patient at time of visit.    Health Maintenance Due   Topic Date Due     DTAP/TDAP/TD IMMUNIZATION (1 - Tdap) 12/27/2003     HPV IMMUNIZATION (1 - Female 2-dose series) 12/27/2007     HIV SCREENING  12/27/2011     PAP  12/27/2017         There is no immunization history on file for this patient.    No results found for: PAP      Recent Labs   Lab Test 01/17/20  1048 01/10/20  1344 01/08/20  1621 "   ALT  --  31 37   CR  --  0.59 0.71   GFRESTIMATED  --  >90 >90   GFRESTBLACK  --  >90 >90   ALBUMIN  --  3.9 4.5   POTASSIUM  --  3.9 4.2   TSH 1.32  --   --        PHQ-2 ( 1999 Pfizer) 1/17/2020 1/8/2020   Q1: Little interest or pleasure in doing things 2 2   Q2: Feeling down, depressed or hopeless 1 2   PHQ-2 Score 3 4       PHQ-9 SCORE 1/8/2020 1/17/2020   PHQ-9 Total Score 14 -   PHQ-A Total Score - 12       MOON-7 SCORE 1/8/2020 1/17/2020   Total Score 10 8       No flowsheet data found.      Shira Jose, [facility]  January 30, 2020 3:56 PM

## 2020-01-30 NOTE — PATIENT INSTRUCTIONS
Iron:  65 mg elemental iron twice daily    [facility]  CALL NOW ([phone])  [email or web address]    [facility]   [address] [place]  [phone]

## 2020-01-31 LAB
BASOPHILS # BLD AUTO: 0 10E9/L (ref 0–0.2)
BASOPHILS NFR BLD AUTO: 0.2 %
DIFFERENTIAL METHOD BLD: ABNORMAL
EOSINOPHIL # BLD AUTO: 0.1 10E9/L (ref 0–0.7)
EOSINOPHIL NFR BLD AUTO: 1.1 %
ERYTHROCYTE [DISTWIDTH] IN BLOOD BY AUTOMATED COUNT: 22.9 % (ref 10–15)
FERRITIN SERPL-MCNC: 10 NG/ML (ref 12–150)
HCT VFR BLD AUTO: 34.1 % (ref 35–47)
HGB BLD-MCNC: 9.6 G/DL (ref 11.7–15.7)
IMM GRANULOCYTES # BLD: 0 10E9/L (ref 0–0.4)
IMM GRANULOCYTES NFR BLD: 0.4 %
IRON SATN MFR SERPL: 64 % (ref 15–46)
IRON SERPL-MCNC: 266 UG/DL (ref 35–180)
LYMPHOCYTES # BLD AUTO: 1.7 10E9/L (ref 0.8–5.3)
LYMPHOCYTES NFR BLD AUTO: 29.2 %
MCH RBC QN AUTO: 20 PG (ref 26.5–33)
MCHC RBC AUTO-ENTMCNC: 28.2 G/DL (ref 31.5–36.5)
MCV RBC AUTO: 71 FL (ref 78–100)
MONOCYTES # BLD AUTO: 0.5 10E9/L (ref 0–1.3)
MONOCYTES NFR BLD AUTO: 8.1 %
NEUTROPHILS # BLD AUTO: 3.5 10E9/L (ref 1.6–8.3)
NEUTROPHILS NFR BLD AUTO: 61 %
NRBC # BLD AUTO: 0 10*3/UL
NRBC BLD AUTO-RTO: 0 /100
PLATELET # BLD AUTO: 385 10E9/L (ref 150–450)
RBC # BLD AUTO: 4.8 10E12/L (ref 3.8–5.2)
TIBC SERPL-MCNC: 413 UG/DL (ref 240–430)
WBC # BLD AUTO: 5.7 10E9/L (ref 4–11)

## 2020-02-01 LAB — ENDOMYSIUM IGA TITR SER IF: NORMAL {TITER}

## 2020-02-03 LAB
GLIADIN IGA SER-ACNC: 11 U/ML
GLIADIN IGG SER-ACNC: 5 U/ML
IGA SERPL-MCNC: 169 MG/DL (ref 84–499)
TTG IGA SER-ACNC: 1 U/ML
TTG IGG SER-ACNC: 2 U/ML

## 2020-02-04 LAB
DQA1*: NORMAL
DQA1*LOCUS: NORMAL
DQB1* LOCUS: NORMAL
DQB1*: NORMAL
DRSSO COMMENTS: NORMAL
DRSSO TEST METHOD: NORMAL

## 2020-02-06 ENCOUNTER — OFFICE VISIT (OUTPATIENT)
Dept: MIDWIFE SERVICES | Facility: CLINIC | Age: 25
End: 2020-02-06
Payer: COMMERCIAL

## 2020-02-06 VITALS
WEIGHT: 125 LBS | DIASTOLIC BLOOD PRESSURE: 72 MMHG | SYSTOLIC BLOOD PRESSURE: 120 MMHG | BODY MASS INDEX: 21.59 KG/M2 | HEART RATE: 80 BPM

## 2020-02-06 DIAGNOSIS — Z30.011 ENCOUNTER FOR INITIAL PRESCRIPTION OF CONTRACEPTIVE PILLS: Primary | ICD-10-CM

## 2020-02-06 DIAGNOSIS — Z30.432 ENCOUNTER FOR IUD REMOVAL: ICD-10-CM

## 2020-02-06 PROCEDURE — 99203 OFFICE O/P NEW LOW 30 MIN: CPT | Mod: 25 | Performed by: ADVANCED PRACTICE MIDWIFE

## 2020-02-06 PROCEDURE — 58301 REMOVE INTRAUTERINE DEVICE: CPT | Performed by: ADVANCED PRACTICE MIDWIFE

## 2020-02-06 RX ORDER — LEVONORGESTREL/ETHIN.ESTRADIOL 0.1-0.02MG
1 TABLET ORAL DAILY
Qty: 84 TABLET | Refills: 3 | Status: SHIPPED | OUTPATIENT
Start: 2020-02-06

## 2020-02-06 NOTE — PROGRESS NOTES
Tomasz  is a 24 year old who presents to the clinic for discussion of birth control methods. She has a paragard in place which is mal positioned. She would like her iud removed and to start OCPs.   She has used the following methods in the past:combination pills     Histories reviewed and updated  Past Medical History:   Diagnosis Date     Anxiety      History reviewed. No pertinent surgical history.  Social History     Socioeconomic History     Marital status: Single     Spouse name: Not on file     Number of children: Not on file     Years of education: Not on file     Highest education level: Not on file   Occupational History     Not on file   Social Needs     Financial resource strain: Not on file     Food insecurity:     Worry: Not on file     Inability: Not on file     Transportation needs:     Medical: Not on file     Non-medical: Not on file   Tobacco Use     Smoking status: Never Smoker     Smokeless tobacco: Never Used     Tobacco comment: never vaped   Substance and Sexual Activity     Alcohol use: Yes     Frequency: 2-4 times a month     Drinks per session: 1 or 2     Binge frequency: Never     Comment: 2 drinks per week      Drug use: Never     Sexual activity: Yes     Partners: Male     Birth control/protection: I.U.D.   Lifestyle     Physical activity:     Days per week: Not on file     Minutes per session: Not on file     Stress: Not on file   Relationships     Social connections:     Talks on phone: Not on file     Gets together: Not on file     Attends Congregational service: Not on file     Active member of club or organization: Not on file     Attends meetings of clubs or organizations: Not on file     Relationship status: Not on file     Intimate partner violence:     Fear of current or ex partner: Not on file     Emotionally abused: Not on file     Physically abused: Not on file     Forced sexual activity: Not on file   Other Topics Concern     Not on file   Social History Narrative    How  much exercise per week? walking daily    How much calcium per day? In foods       How much caffeine per day? 0    How much vitamin D per day? In foods    Do you/your family wear seatbelts?  Yes    Do you/your family use safety helmets? Yes    Do you/your family use sunscreen? No    Do you/your family keep firearms in the home? No    Do you/your family have a smoke detector(s)? Yes        iwed bernadette n 1-         Family History   Problem Relation Age of Onset     Post-Traumatic Stress Disorder (PTSD) Father      Depression Father      Breast Cancer No family hx of      Colon Cancer No family hx of      Diabetes No family hx of      Thyroid Disease No family hx of        Menstrual History    Menses every 30 days.  Length of menses: 5 days  Menstrual description: irregular        CONSTITUTIONAL: Healthy, alert, in no apparent distress.  EYES: NEGATIVE  ENT/MOUTH: NEGATIVE  RESP: NEGATIVE  CV: NEGATIVE  GI: NEGATIVE  : NEGATIVE  MUSCULOSKELETAL: NEGATIVE  INTEGUMENTARY/SKIN: NEGATIVE  BREAST: NEGATIVE  NEURO: NEGATIVE  ENDOCRINE: NEGATIVE  HEME/ALLERGY/IMMUNE: NEGATIVE  PSYCHIATRIC: NEGATIVE    EXAM:  Vitals: /72   Pulse 80   Wt 56.7 kg (125 lb)   Breastfeeding No   BMI 21.59 kg/m    BMI= Body mass index is 21.59 kg/m .    RESP: Clear to auscultation and NEGATIVE  CV: NEGATIVE  CHEST (BREAST): NEGATIVE  GI: NEGATIVE  : PELVIC EXAM:  Vulva: No external lesions, normal hair distribution, no adenopathy, BUS WNL  Vagina: Moist, pink, no abnormal discharge, well rugated, no lesions  Cervix:, smooth, pink, no visible lesions, neg CMT  Uterus: Normal size, anteverted, non-tender, mobile  Ovaries: No mass, non-tender, mobile  Rectal exam: deferred  PSYCH: NEGATIVE    IUD REMOVAL PROCEDURE:  IUD strings visible at cervical os. IUD strings were grasped using ringed forceps. The IUD was removed using gentle traction. It was inspected and appeared intact. IUD was shown to the patient. Tomasz tolerated the  procedure well.     ASSESSMENT/PLAN:  iud removal procedure- sucessful  There are no contraindications to the use of     (Z30.011) Encounter for initial prescription of contraceptive pills  (primary encounter diagnosis)  Comment:   Plan: levonorgestrel-ethinyl estradiol         (AVIANE/ALESSE/LESSINA) 0.1-20 MG-MCG tablet            Akosua Maher, ARLETM, APRN CNM'

## 2020-02-09 ENCOUNTER — MYC MEDICAL ADVICE (OUTPATIENT)
Dept: OBGYN | Facility: CLINIC | Age: 25
End: 2020-02-09

## 2020-02-09 RX ORDER — FERROUS SULFATE 7.5 MG/0.5
2 SYRINGE (EA) ORAL DAILY
Qty: 50 ML | Refills: 1 | Status: SHIPPED | OUTPATIENT
Start: 2020-02-09 | End: 2020-04-09

## 2020-02-17 ENCOUNTER — OFFICE VISIT (OUTPATIENT)
Dept: FAMILY MEDICINE | Facility: CLINIC | Age: 25
End: 2020-02-17
Payer: COMMERCIAL

## 2020-02-17 VITALS
HEIGHT: 64 IN | TEMPERATURE: 98.4 F | RESPIRATION RATE: 16 BRPM | OXYGEN SATURATION: 99 % | SYSTOLIC BLOOD PRESSURE: 98 MMHG | HEART RATE: 83 BPM | WEIGHT: 127 LBS | BODY MASS INDEX: 21.68 KG/M2 | DIASTOLIC BLOOD PRESSURE: 61 MMHG

## 2020-02-17 DIAGNOSIS — K90.0 CELIAC DISEASE: Primary | ICD-10-CM

## 2020-02-17 ASSESSMENT — MIFFLIN-ST. JEOR: SCORE: 1317.66

## 2020-02-17 NOTE — NURSING NOTE
"24 year old  Chief Complaint   Patient presents with     RECHECK     Pt. presents to the clinic today for a follow up on labs.        Blood pressure 98/61, pulse 83, temperature 98.4  F (36.9  C), temperature source Oral, resp. rate 16, height 1.628 m (5' 4.1\"), weight 57.6 kg (127 lb), SpO2 99 %, not currently breastfeeding. Body mass index is 21.73 kg/m .  BP completed using cuff size:    There is no problem list on file for this patient.      Wt Readings from Last 2 Encounters:   02/17/20 57.6 kg (127 lb)   02/06/20 56.7 kg (125 lb)     BP Readings from Last 3 Encounters:   02/17/20 98/61   02/06/20 120/72   01/30/20 115/78       Allergies   Allergen Reactions     Contrast Dye Rash     Iodine Rash       Current Outpatient Medications   Medication     BENZOYL PEROXIDE WASH 5 % external liquid     clindamycin (CLEOCIN T) 1 % external lotion     ferrous sulfate (KIT-IN-SOL) 75 (15 FE) MG/ML oral drops     levonorgestrel-ethinyl estradiol (AVIANE/ALESSE/LESSINA) 0.1-20 MG-MCG tablet     NONFORMULARY     paragard intrauterine copper device     spironolactone (ALDACTONE) 50 MG tablet     tretinoin (RETIN-A) 0.05 % external cream     No current facility-administered medications for this visit.        Social History     Tobacco Use     Smoking status: Never Smoker     Smokeless tobacco: Never Used     Tobacco comment: never vaped   Substance Use Topics     Alcohol use: Yes     Frequency: 2-4 times a month     Drinks per session: 1 or 2     Binge frequency: Never     Comment: 2 drinks per week      Drug use: Never       Honoring Choices - Health Care Directive Guide offered to patient at time of visit.    Health Maintenance Due   Topic Date Due     DEPRESSION ACTION PLAN  1996     DTAP/TDAP/TD IMMUNIZATION (1 - Tdap) 12/27/2003     HPV IMMUNIZATION (1 - Female 2-dose series) 12/27/2007     HIV SCREENING  12/27/2011     PAP  12/27/2017         There is no immunization history on file for this patient.    No results " found for: PAP      Recent Labs   Lab Test 01/17/20  1048 01/10/20  1344 01/08/20  1621   ALT  --  31 37   CR  --  0.59 0.71   GFRESTIMATED  --  >90 >90   GFRESTBLACK  --  >90 >90   ALBUMIN  --  3.9 4.5   POTASSIUM  --  3.9 4.2   TSH 1.32  --   --        PHQ-2 ( 1999 Pfizer) 1/17/2020 1/8/2020   Q1: Little interest or pleasure in doing things 2 2   Q2: Feeling down, depressed or hopeless 1 2   PHQ-2 Score 3 4       PHQ-9 SCORE 1/8/2020 1/17/2020   PHQ-9 Total Score 14 -   PHQ-A Total Score - 12       MOON-7 SCORE 1/8/2020 1/17/2020   Total Score 10 8       No flowsheet data found.    Gema Elliott CMA  February 17, 2020 3:53 PM

## 2020-02-25 NOTE — PROGRESS NOTES
"Tomasz  is a 24 year old female who presents today for a hospital follow up.  She had a stabbing pain in her right lower abdomen, she was seen in our clinic, it got much worse so she went to the ED.  They ruled out any surgical need and stated it is most likely an ovarian cyst.  She is wondering if there is any particular medication that could \"break up\" a cyst like that.      It was noted that she was anemic in the ED, but they did not do any follow up except to encourage her to take her iron and to follow up here.    Tomasz is also feeling a deepening of her feelings of depression.  She denies suicidal ideation.  She is fatigued and isn't as interested in doing her usual activities.  She states she is usually energetic and she is hoping to get a therapy referral.      Review Of Systems   ROS: 10 point ROS neg other than the symptoms noted above in the HPI.    Past Medical History:   Diagnosis Date     Anxiety      History reviewed. No pertinent surgical history.  Social History     Socioeconomic History     Marital status: Single     Spouse name: Not on file     Number of children: Not on file     Years of education: Not on file     Highest education level: Not on file   Occupational History     Not on file   Social Needs     Financial resource strain: Not on file     Food insecurity:     Worry: Not on file     Inability: Not on file     Transportation needs:     Medical: Not on file     Non-medical: Not on file   Tobacco Use     Smoking status: Never Smoker     Smokeless tobacco: Never Used     Tobacco comment: never vaped   Substance and Sexual Activity     Alcohol use: Yes     Frequency: 2-4 times a month     Drinks per session: 1 or 2     Binge frequency: Never     Comment: 2 drinks per week      Drug use: Never     Sexual activity: Yes     Partners: Male     Birth control/protection: I.U.D.   Lifestyle     Physical activity:     Days per week: Not on file     Minutes per session: Not on file     Stress: " "Not on file   Relationships     Social connections:     Talks on phone: Not on file     Gets together: Not on file     Attends Shinto service: Not on file     Active member of club or organization: Not on file     Attends meetings of clubs or organizations: Not on file     Relationship status: Not on file     Intimate partner violence:     Fear of current or ex partner: Not on file     Emotionally abused: Not on file     Physically abused: Not on file     Forced sexual activity: Not on file   Other Topics Concern     Not on file   Social History Narrative    How much exercise per week? walking daily    How much calcium per day? In foods       How much caffeine per day? 0    How much vitamin D per day? In foods    Do you/your family wear seatbelts?  Yes    Do you/your family use safety helmets? Yes    Do you/your family use sunscreen? No    Do you/your family keep firearms in the home? No    Do you/your family have a smoke detector(s)? Yes        reviwed [facility]ckim [facility] 1-         Family History   Problem Relation Age of Onset     Post-Traumatic Stress Disorder (PTSD) Father      Depression Father      Breast Cancer No family hx of      Colon Cancer No family hx of      Diabetes No family hx of      Thyroid Disease No family hx of        /78   Pulse 100   Temp 97.9  F (36.6  C) (Oral)   Ht 1.621 m (5' 3.8\")   Wt 56.1 kg (123 lb 11.2 oz)   SpO2 99%   BMI 21.37 kg/m      Exam:  Constitutional: healthy, alert and no distress  Head: Normocephalic. No masses, lesions, tenderness or abnormalities  Neck: Neck supple. No adenopathy. Thyroid symmetric, normal size,, Carotids without bruits.  ENT: ENT exam normal, no neck nodes or sinus tenderness  Cardiovascular: negative, PMI normal. No lifts, heaves, or thrills. RRR. No murmurs, clicks gallops or rub  Respiratory: negative, Percussion normal. Good diaphragmatic excursion. Lungs clear  Gastrointestinal: Abdomen soft, non-tender. BS normal. No masses, " organomegaly  : Deferred  Psychiatric: mentation appears normal and affect normal/bright  Hematologic/Lymphatic/Immunologic: Normal cervical lymph nodes    Assessment/Plan:  1. Anemia, unspecified type    - CBC with platelets differential  - Iron and iron binding capacity  - Ferritin  - IgA [LAB73]  - Deamidated Giladin Peptide Nida IgA IgG [[id number]]  - Tissue transglutaminase nida IgA and IgG [QDO2901]  - Endomysial Antibody IgA by IFA [[id number]]  - HLA DQB1 for Celiac Disease [[id number]]    I told Tomasz that we needed to do a more thorough work up of her anemia, as we don't have a source.  We will start with labs.    2. Depression, unspecified depression type    - MENTAL HEALTH REFERRAL  - Adult; Outpatient Treatment; Individual/Couples/Family/Group Therapy/Health Psychology; [facility] - James Gonzales [phone]; Patient call to schedule      Options for treatment and follow-up care were reviewed with the patient. Patient engaged in the decision making process and verbalized understanding of the options discussed and agreed with the final plan.

## 2020-03-09 NOTE — PROGRESS NOTES
Tomasz  is a 24 year old female who presents today to discuss labs and a referral to gastroenterology for positive labs for celiac disease.  She is tired but otherwise no significant symptoms.  She noted the labs and has been looking at a gluten free diet and has been trying to follow that since the potential diagnosis.  She is finding it a little hard to follow.    Review Of Systems   ROS: 10 point ROS neg other than the symptoms noted above in the HPI.  See PMH and previous documentation related to anemia    Past Medical History:   Diagnosis Date     Anxiety    Anemia  History reviewed. No pertinent surgical history.  Social History     Socioeconomic History     Marital status: Single     Spouse name: Not on file     Number of children: Not on file     Years of education: Not on file     Highest education level: Not on file   Occupational History     Not on file   Social Needs     Financial resource strain: Not on file     Food insecurity     Worry: Not on file     Inability: Not on file     Transportation needs     Medical: Not on file     Non-medical: Not on file   Tobacco Use     Smoking status: Never Smoker     Smokeless tobacco: Never Used     Tobacco comment: never vaped   Substance and Sexual Activity     Alcohol use: Yes     Frequency: 2-4 times a month     Drinks per session: 1 or 2     Binge frequency: Never     Comment: 2 drinks per week      Drug use: Never     Sexual activity: Yes     Partners: Male     Birth control/protection: I.U.D.   Lifestyle     Physical activity     Days per week: Not on file     Minutes per session: Not on file     Stress: Not on file   Relationships     Social connections     Talks on phone: Not on file     Gets together: Not on file     Attends Zoroastrianism service: Not on file     Active member of club or organization: Not on file     Attends meetings of clubs or organizations: Not on file     Relationship status: Not on file     Intimate partner violence     Fear of  "current or ex partner: Not on file     Emotionally abused: Not on file     Physically abused: Not on file     Forced sexual activity: Not on file   Other Topics Concern     Not on file   Social History Narrative    How much exercise per week? walking daily    How much calcium per day? In foods       How much caffeine per day? 0    How much vitamin D per day? In foods    Do you/your family wear seatbelts?  Yes    Do you/your family use safety helmets? Yes    Do you/your family use sunscreen? No    Do you/your family keep firearms in the home? No    Do you/your family have a smoke detector(s)? Yes        reviwed cmck[facility] 1-         Family History   Problem Relation Age of Onset     Post-Traumatic Stress Disorder (PTSD) Father      Depression Father      Breast Cancer No family hx of      Colon Cancer No family hx of      Diabetes No family hx of      Thyroid Disease No family hx of        BP 98/61 (BP Location: Left arm, Patient Position: Chair, Cuff Size: Adult Regular)   Pulse 83   Temp 98.4  F (36.9  C) (Oral)   Resp 16   Ht 1.628 m (5' 4.1\")   Wt 57.6 kg (127 lb)   SpO2 99%   BMI 21.73 kg/m      Exam:  Constitutional: healthy, alert and no distress  Psychiatric: mentation appears normal and affect normal/bright    Assessment/Plan:  1. Celiac disease    - GASTROENTEROLOGY ADULT REF CONSULT ONLY- discussion and potential further testing for celiac disease.  Continue to use mostly gluten free diet    RTC prn      Options for treatment and follow-up care were reviewed with the patient. Patient engaged in the decision making process and verbalized understanding of the options discussed and agreed with the final plan.    "

## 2020-03-11 ENCOUNTER — HEALTH MAINTENANCE LETTER (OUTPATIENT)
Age: 25
End: 2020-03-11

## 2020-04-10 ENCOUNTER — TELEPHONE (OUTPATIENT)
Dept: FAMILY MEDICINE | Facility: CLINIC | Age: 25
End: 2020-04-10

## 2020-04-10 NOTE — TELEPHONE ENCOUNTER
Called patient to check in on her and to inform her that we are able to provide video visits. Left a message for her to call the clinic if she had any questions or concerns.    Gema Elliott, MELVI

## 2021-01-04 ENCOUNTER — HEALTH MAINTENANCE LETTER (OUTPATIENT)
Age: 26
End: 2021-01-04

## 2021-03-07 ENCOUNTER — HEALTH MAINTENANCE LETTER (OUTPATIENT)
Age: 26
End: 2021-03-07

## 2021-10-10 ENCOUNTER — HEALTH MAINTENANCE LETTER (OUTPATIENT)
Age: 26
End: 2021-10-10

## 2022-03-27 ENCOUNTER — HEALTH MAINTENANCE LETTER (OUTPATIENT)
Age: 27
End: 2022-03-27

## 2022-09-18 ENCOUNTER — HEALTH MAINTENANCE LETTER (OUTPATIENT)
Age: 27
End: 2022-09-18

## 2023-05-08 ENCOUNTER — HEALTH MAINTENANCE LETTER (OUTPATIENT)
Age: 28
End: 2023-05-08